# Patient Record
Sex: MALE | Race: WHITE | Employment: UNEMPLOYED | URBAN - METROPOLITAN AREA
[De-identification: names, ages, dates, MRNs, and addresses within clinical notes are randomized per-mention and may not be internally consistent; named-entity substitution may affect disease eponyms.]

---

## 2018-03-06 ENCOUNTER — PATIENT OUTREACH (OUTPATIENT)
Dept: CASE MANAGEMENT | Age: 68
End: 2018-03-06

## 2018-03-07 NOTE — PROGRESS NOTES
Downtime progress note entry for [facility] : Jacqui Fothergill, RN    Transition of Care Discharge Follow-up Questionnaire   Date/Time of Call: 3/6/18  Patient name: Krista Mckoy  : 54  MRN: [id number]           What was the patient hospitalized for? Pelvic Fx (pt was sleeping behind a dumpster and got crushed by [employer] truck). No surgery was needed according to notes, [facility] (no name was given)         Does the patient understand his/her diagnosis and/or treatment and what happened during the hospitalization? Reached out to pt. not receiving VM at this time. no other #s available to call. there is no assigned Dr in [facility]. nurse reached out to PCP that was listed in [facility]. Christine verbalized that pt was never seen there and is not a current pt. nurse will continue to reach out to pt to establish communication. Did the patient receive discharge instructions? Review any discharge instructions (see notes in [facility]). Ask patient if they understand these. Do they have any questions? Were home services ordered (nursing, PT, OT, ST, etc.)? If so, has the first visit occurred? If not, why? (Assist with coordination of services if necessary.)          Was any DME ordered? If so, has it been received? If not, why?  (Assist with coordination of arranging DME orders if necessary.)          Complete a review of all medications (new, continued and discontinued meds per the D/C instructions and medication tab in [facility]). Were all new prescriptions filled? If not, why?  (Assist with obtainment of medications if necessary.)          Does the patient understand the purpose and dosing instructions for all medications?   (If patient has questions, provide explanation and education.)    Does the patient have any problems in performing ADLs? (If patient is unable to perform ADLs - what is the limiting factor(s)? Do they have a support system that can assist? If no support system is present, discuss possible assistance that they may be able to obtain.)              Does the patient have all follow-up appointments scheduled? 7 day f/up with PCP?    7-14 day f/up with specialist?    If f/up has not been made - what actions has the care coordinator made to accomplish this? Has transportation been arranged? [facility] follow-up should be within 7 days of discharge; all others should have PCP follow-up within 7 days of discharge; follow-ups with other specialists should be within 7-14 days of discharge.)    Any other questions or concerns expressed by the patient? Schedule next appointment with [facility] Coordinator or refer to RN Case Manager/  per the workflow guidelines. When is care coordinators next follow-up call scheduled? If referred for CCM - what RN care manager was the referral assigned?     JORGE Call Completed By:   Loraine Bella

## 2018-03-22 ENCOUNTER — PATIENT OUTREACH (OUTPATIENT)
Dept: CASE MANAGEMENT | Age: 68
End: 2018-03-22

## 2018-03-23 NOTE — PROGRESS NOTES
Downtime progress note entry for [facility] : Alejandro Huerta RN    Care  Follow up Outreach Note   Outreach type: Follow up  Patient name: Miguel Solorzano    : 1950  MRN: [id number]    Date/Time of Outreach: 3/22/18     Reason for follow-up:   Continuation of care   Disease specific complaints/issues: Pelvic Fx       Patient progress towards goals set from last contact:   Number on file, both in [facility] and [facility] is invalid. Pt is wrongly attributed. not a pt at [facility]. Nurse will make this his last attempt due to multiple unsuccessful attempts. Has patient attended any PCP or specialist follow-up appointments since last contact? What was outcome of appointment? When is next follow-up scheduled? Review medications. Any medication changes since last outreach? Does patient have any questions or issues related to their medications? Home health active? If yes - any issue? Progress? Referrals needed?  (SW, Diabetes education, HH, etc. )    Other issues/Miscellaneous?  (Transportation, access to meals, ability to perform ADLs, adequate caregiver support, etc.)              Next Outreach Scheduled: Discharged     Next Steps/Goals:      Care  completing call: Alejandro Huerta

## 2019-03-05 ENCOUNTER — PATIENT OUTREACH (OUTPATIENT)
Dept: CASE MANAGEMENT | Age: 69
End: 2019-03-05

## 2019-03-06 ENCOUNTER — PATIENT OUTREACH (OUTPATIENT)
Dept: CASE MANAGEMENT | Age: 69
End: 2019-03-06

## 2019-03-06 NOTE — PROGRESS NOTES
Downtime Progress Note for [facility]cend : [facility]  Follow up Outreach Note   Outreach type: Follow up  Patient name: Gabriela Bee  : 1950  MRN: [id number]    Date/Time of Outreach: 3/5/19     Reason for follow-up:   Continuation of care   Disease specific complaints/issues: Acute resp Failure       Patient progress towards goals set from last contact:   Pt not accepting calls at this time. nurse will continue to monitor and provide care as needed. Reached out to PCP, Verbalized that pt is not a member of [employer]. There is no info in [facility]. Nurse will continue to reach out to establish communication. Has patient attended any PCP or specialist follow-up appointments since last contact? What was outcome of appointment? When is next follow-up scheduled? Review medications. Any medication changes since last outreach? Does patient have any questions or issues related to their medications? Home health active? If yes - any issue? Progress? Referrals needed?  (SW, Diabetes education, HH, etc. )    Other issues/Miscellaneous?  (Transportation, access to meals, ability to perform ADLs, adequate caregiver support, etc.)              Next Outreach Scheduled: Week of the      Next Steps/Goals:      Care  completing call: Julio Ring

## 2019-03-09 NOTE — PROGRESS NOTES
Downtime Progress Note for Transcend : [facility] [facility]  Follow up Outreach Note   Outreach type: Follow up  Patient name: Brittney Gracia  : 54  MRN: [id number]    Date/Time of Outreach: 3/6/19     Reason for follow-up:   Continuation of care   Disease specific complaints/issues: Acute resp Failure       Patient progress towards goals set from last contact:   reached out to pt and number is not receiving calls at this time. nurse will continue to reach out and provide care as needed. Has patient attended any PCP or specialist follow-up appointments since last contact? What was outcome of appointment? When is next follow-up scheduled? Review medications. Any medication changes since last outreach? Does patient have any questions or issues related to their medications? Home health active? If yes - any issue? Progress? Referrals needed?  (SW, Diabetes education, HH, etc. )    Other issues/Miscellaneous?  (Transportation, access to meals, ability to perform ADLs, adequate caregiver support, etc.)              Next Outreach Scheduled: Week of the      Next Steps/Goals:      Care  completing call: Brenda Patterson

## 2019-03-14 ENCOUNTER — PATIENT OUTREACH (OUTPATIENT)
Dept: CASE MANAGEMENT | Age: 69
End: 2019-03-14

## 2019-03-14 NOTE — PROGRESS NOTES
Downtime Progress Note for Transcend : [facility]  Follow up Outreach Note   Outreach type: Follow up  Patient name: Emily Corcoran  : 54  MRN: [id number]    Date/Time of Outreach: 3/14/19     Reason for follow-up:   Continuation of care   Disease specific complaints/issues: Acute respiratory Failure       Patient progress towards goals set from last contact:   reached out to pts #. # not receiving calls at this time. nurse will reach out one last time and then discharge from care. Has patient attended any PCP or specialist follow-up appointments since last contact? What was outcome of appointment? When is next follow-up scheduled? Review medications. Any medication changes since last outreach? Does patient have any questions or issues related to their medications? Home health active? If yes - any issue? Progress? Referrals needed?  (SW, Diabetes education, HH, etc. )    Other issues/Miscellaneous?  (Transportation, access to meals, ability to perform ADLs, adequate caregiver support, etc.)              Next Outreach Scheduled: Week of the      Next Steps/Goals:      Care  completing call: Ayse Ireland

## 2019-04-04 ENCOUNTER — HOSPITAL ENCOUNTER (OUTPATIENT)
Dept: LAB | Age: 69
Discharge: HOME | End: 2019-04-04

## 2019-04-04 LAB
ANION GAP SERPL CALC-SCNC: 5 MMOL/L (ref 7–16)
BUN SERPL-MCNC: 28 MG/DL (ref 8–23)
CALCIUM SERPL-MCNC: 8.2 MG/DL (ref 8.3–10.4)
CHLORIDE SERPL-SCNC: 103 MMOL/L (ref 98–107)
CO2 SERPL-SCNC: 30 MMOL/L (ref 21–32)
CREAT SERPL-MCNC: 0.97 MG/DL (ref 0.8–1.5)
GLUCOSE SERPL-MCNC: 108 MG/DL (ref 65–100)
POTASSIUM SERPL-SCNC: 4.1 MMOL/L (ref 3.5–5.1)
SODIUM SERPL-SCNC: 138 MMOL/L (ref 136–145)

## 2019-04-04 PROCEDURE — 80048 BASIC METABOLIC PNL TOTAL CA: CPT

## 2019-04-09 ENCOUNTER — PATIENT OUTREACH (OUTPATIENT)
Dept: CASE MANAGEMENT | Age: 69
End: 2019-04-09

## 2019-04-10 NOTE — PROGRESS NOTES
Downtime Progress Note for Transcend : [facility] [facility]  Follow up Outreach Note   Outreach type: Follow up  Patient name: Fawn Breaux  : 54  MRN: [id number]    Date/Time of Outreach: 19     Reason for follow-up:   Continuation of care   Disease specific complaints/issues: PNA       Patient progress towards goals set from last contact:   19 Invalid phone #. Pt was discharged to long-term. Nurse reached out to [facility], VM option. pt is not a member of [employer]. nurse will continue to reach out and provide care as needed. Has patient attended any PCP or specialist follow-up appointments since last contact? What was outcome of appointment? When is next follow-up scheduled? Review medications. Any medication changes since last outreach? Does patient have any questions or issues related to their medications? Home health active? If yes - any issue? Progress? Referrals needed?  (SW, Diabetes education, HH, etc. )    Other issues/Miscellaneous?  (Transportation, access to meals, ability to perform ADLs, adequate caregiver support, etc.)              Next Outreach Scheduled: 4/10/19     Next Steps/Goals:      Care  completing call: Eliecer Anne

## 2021-11-30 ENCOUNTER — HOSPICE ADMISSION (OUTPATIENT)
Dept: HOSPICE | Facility: HOSPICE | Age: 71
End: 2021-11-30